# Patient Record
Sex: FEMALE | Race: WHITE | URBAN - METROPOLITAN AREA
[De-identification: names, ages, dates, MRNs, and addresses within clinical notes are randomized per-mention and may not be internally consistent; named-entity substitution may affect disease eponyms.]

---

## 2017-12-27 ENCOUNTER — HOSPITAL ENCOUNTER (OUTPATIENT)
Dept: SURGERY | Age: 57
Discharge: HOME | End: 2017-12-27
Attending: PODIATRIST | Admitting: PODIATRIST

## 2017-12-27 VITALS
WEIGHT: 210 LBS | HEART RATE: 55 BPM | DIASTOLIC BLOOD PRESSURE: 77 MMHG | SYSTOLIC BLOOD PRESSURE: 128 MMHG | HEIGHT: 64 IN | TEMPERATURE: 98.3 F | BODY MASS INDEX: 35.85 KG/M2 | OXYGEN SATURATION: 100 % | RESPIRATION RATE: 16 BRPM

## 2017-12-27 DIAGNOSIS — M72.2 PLANTAR FASCIITIS OF LEFT FOOT: ICD-10-CM

## 2017-12-27 RX ORDER — ACETAMINOPHEN 10 MG/ML
INJECTION, SOLUTION INTRAVENOUS
Status: DISCONTINUED
Start: 2017-12-27 | End: 2017-12-27 | Stop reason: WASHOUT

## 2017-12-27 RX ORDER — LABETALOL HYDROCHLORIDE 5 MG/ML
5 INJECTION, SOLUTION INTRAVENOUS EVERY 10 MIN PRN
Status: DISCONTINUED | OUTPATIENT
Start: 2017-12-27 | End: 2017-12-28 | Stop reason: HOSPADM

## 2017-12-27 RX ORDER — 0.9 % SODIUM CHLORIDE 0.9 %
10 VIAL (ML) INJECTION PRN
Status: DISCONTINUED | OUTPATIENT
Start: 2017-12-27 | End: 2017-12-28 | Stop reason: HOSPADM

## 2017-12-27 RX ORDER — LIDOCAINE HYDROCHLORIDE 10 MG/ML
1 INJECTION, SOLUTION EPIDURAL; INFILTRATION; INTRACAUDAL; PERINEURAL
Status: COMPLETED | OUTPATIENT
Start: 2017-12-27 | End: 2017-12-27

## 2017-12-27 RX ORDER — OXYCODONE HYDROCHLORIDE 5 MG/1
5 TABLET ORAL PRN
Status: COMPLETED | OUTPATIENT
Start: 2017-12-27 | End: 2017-12-27

## 2017-12-27 RX ORDER — OXYCODONE HYDROCHLORIDE AND ACETAMINOPHEN 5; 325 MG/1; MG/1
2 TABLET ORAL EVERY 4 HOURS PRN
Qty: 60 TABLET | Refills: 0 | Status: SHIPPED | OUTPATIENT
Start: 2017-12-27 | End: 2018-01-01

## 2017-12-27 RX ORDER — 0.9 % SODIUM CHLORIDE 0.9 %
10 VIAL (ML) INJECTION EVERY 12 HOURS SCHEDULED
Status: DISCONTINUED | OUTPATIENT
Start: 2017-12-27 | End: 2017-12-28 | Stop reason: HOSPADM

## 2017-12-27 RX ORDER — HYDROMORPHONE HCL 110MG/55ML
0.25 PATIENT CONTROLLED ANALGESIA SYRINGE INTRAVENOUS EVERY 5 MIN PRN
Status: DISCONTINUED | OUTPATIENT
Start: 2017-12-27 | End: 2017-12-28 | Stop reason: HOSPADM

## 2017-12-27 RX ORDER — MEPERIDINE HYDROCHLORIDE 25 MG/ML
12.5 INJECTION INTRAMUSCULAR; INTRAVENOUS; SUBCUTANEOUS EVERY 5 MIN PRN
Status: DISCONTINUED | OUTPATIENT
Start: 2017-12-27 | End: 2017-12-28 | Stop reason: HOSPADM

## 2017-12-27 RX ORDER — MORPHINE SULFATE 10 MG/ML
1 INJECTION, SOLUTION INTRAMUSCULAR; INTRAVENOUS EVERY 5 MIN PRN
Status: DISCONTINUED | OUTPATIENT
Start: 2017-12-27 | End: 2017-12-28 | Stop reason: HOSPADM

## 2017-12-27 RX ORDER — PROMETHAZINE HYDROCHLORIDE 25 MG/ML
6.25 INJECTION, SOLUTION INTRAMUSCULAR; INTRAVENOUS
Status: ACTIVE | OUTPATIENT
Start: 2017-12-27 | End: 2017-12-27

## 2017-12-27 RX ORDER — HYDROMORPHONE HCL 110MG/55ML
0.5 PATIENT CONTROLLED ANALGESIA SYRINGE INTRAVENOUS EVERY 5 MIN PRN
Status: DISCONTINUED | OUTPATIENT
Start: 2017-12-27 | End: 2017-12-28 | Stop reason: HOSPADM

## 2017-12-27 RX ORDER — PROMETHAZINE HYDROCHLORIDE 25 MG/1
25 TABLET ORAL EVERY 8 HOURS PRN
Qty: 15 TABLET | Refills: 0 | Status: SHIPPED | OUTPATIENT
Start: 2017-12-27 | End: 2018-01-01

## 2017-12-27 RX ORDER — ACETAMINOPHEN 10 MG/ML
INJECTION, SOLUTION INTRAVENOUS
Status: COMPLETED
Start: 2017-12-27 | End: 2017-12-27

## 2017-12-27 RX ORDER — DIPHENHYDRAMINE HYDROCHLORIDE 50 MG/ML
12.5 INJECTION INTRAMUSCULAR; INTRAVENOUS
Status: ACTIVE | OUTPATIENT
Start: 2017-12-27 | End: 2017-12-27

## 2017-12-27 RX ORDER — METOCLOPRAMIDE HYDROCHLORIDE 5 MG/ML
10 INJECTION INTRAMUSCULAR; INTRAVENOUS
Status: ACTIVE | OUTPATIENT
Start: 2017-12-27 | End: 2017-12-27

## 2017-12-27 RX ORDER — HYDRALAZINE HYDROCHLORIDE 20 MG/ML
5 INJECTION INTRAMUSCULAR; INTRAVENOUS EVERY 10 MIN PRN
Status: DISCONTINUED | OUTPATIENT
Start: 2017-12-27 | End: 2017-12-28 | Stop reason: HOSPADM

## 2017-12-27 RX ORDER — OXYCODONE HYDROCHLORIDE 5 MG/1
10 TABLET ORAL PRN
Status: COMPLETED | OUTPATIENT
Start: 2017-12-27 | End: 2017-12-27

## 2017-12-27 RX ORDER — SODIUM CHLORIDE, SODIUM LACTATE, POTASSIUM CHLORIDE, CALCIUM CHLORIDE 600; 310; 30; 20 MG/100ML; MG/100ML; MG/100ML; MG/100ML
INJECTION, SOLUTION INTRAVENOUS CONTINUOUS
Status: DISCONTINUED | OUTPATIENT
Start: 2017-12-27 | End: 2017-12-28 | Stop reason: HOSPADM

## 2017-12-27 RX ADMIN — SODIUM CHLORIDE, SODIUM LACTATE, POTASSIUM CHLORIDE, CALCIUM CHLORIDE: 600; 310; 30; 20 INJECTION, SOLUTION INTRAVENOUS at 14:29

## 2017-12-27 RX ADMIN — LIDOCAINE HYDROCHLORIDE 0.1 ML: 10 INJECTION, SOLUTION EPIDURAL; INFILTRATION; INTRACAUDAL; PERINEURAL at 14:28

## 2017-12-27 RX ADMIN — OXYCODONE HYDROCHLORIDE 5 MG: 5 TABLET ORAL at 15:29

## 2017-12-27 RX ADMIN — ACETAMINOPHEN 1000 MG: 10 INJECTION, SOLUTION INTRAVENOUS at 14:29

## 2017-12-27 ASSESSMENT — PAIN SCALES - GENERAL
PAINLEVEL_OUTOF10: 0
PAINLEVEL_OUTOF10: 7

## 2017-12-27 ASSESSMENT — ACTIVITIES OF DAILY LIVING (ADL): EFFECT OF PAIN ON DAILY ACTIVITIES: STANDING, WALKING

## 2017-12-27 ASSESSMENT — PAIN - FUNCTIONAL ASSESSMENT: PAIN_FUNCTIONAL_ASSESSMENT: 0-10

## 2017-12-27 ASSESSMENT — PAIN DESCRIPTION - DESCRIPTORS: DESCRIPTORS: SHARP

## 2017-12-27 NOTE — ANESTHESIA PRE-OP
Department of Anesthesiology  Preprocedure Note       Name:  Shade GUAJARDO   Age:  61 y.o.  :  1960                                          MRN:  [id number]         Date:  2017      Surgeon: Valentin José    Procedure: Endoscopic Left Plantar Fasciotomy    Medications prior to admission:   Prior to Admission medications    Medication Sig Start Date End Date Taking? Authorizing Provider   clonazepam (KLONOPIN) 0.5 MG tablet Take 1 tablet by mouth every 12 hours as needed for Anxiety. 11  Donald Haywood MD       Current medications:    Current Outpatient Prescriptions   Medication Sig Dispense Refill    clonazepam (KLONOPIN) 0.5 MG tablet Take 1 tablet by mouth every 12 hours as needed for Anxiety.  180 tablet 1     Current Facility-Administered Medications   Medication Dose Route Frequency Provider Last Rate Last Dose    HYDROmorphone (DILAUDID) injection 0.25 mg  0.25 mg Intravenous Q5 Min PRN Anthony Champagne MD        HYDROmorphone (DILAUDID) injection 0.5 mg  0.5 mg Intravenous Q5 Min PRN Anthony Champagne MD        morphine (PF) injection 1 mg  1 mg Intravenous Q5 Min PRN Anthony Champagne MD        HYDROmorphone (DILAUDID) injection 0.5 mg  0.5 mg Intravenous Q5 Min PRN Anthony Champagne MD        oxyCODONE (ROXICODONE) immediate release tablet 5 mg  5 mg Oral PRN Anthony Champagne MD        Or    oxyCODONE (ROXICODONE) immediate release tablet 10 mg  10 mg Oral PRN Anthony Champagne MD        diphenhydrAMINE (BENADRYL) injection 12.5 mg  12.5 mg Intravenous Once PRN Anthony Champagne MD        metoclopramide (REGLAN) injection 10 mg  10 mg Intravenous Once PRN Anthony Champagne MD        promethazine (PHENERGAN) injection 6.25 mg  6.25 mg Intravenous Once PRN Anthony Champagne MD        labetalol (NORMODYNE;TRANDATE) injection 5 mg  5 mg Intravenous Q10 Min PRN Anthony Champagne MD        hydrALAZINE (APRESOLINE) injection 5 mg  5 mg Intravenous Q10 Min PRN Anthony Champagne MD        meperidine (DEMEROL) injection 12.5 mg  12.5 mg Intravenous Q5 Min PRN Samina Cueva MD           Allergies: Allergies   Allergen Reactions    Bactrim Rash       Problem List:    Patient Active Problem List   Diagnosis Code    Migraine G43.909    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    Anxiety F41.9       Past Medical History:        Diagnosis Date    Headache(784.0)     migraines    Lupus erythematosis discoid        Past Surgical History:        Procedure Laterality Date    ROTATOR CUFF REPAIR      LEFT    SPINE SURGERY      lumbar    SPLENECTOMY  1981       Social History:    Social History   Substance Use Topics    Smoking status: Former Smoker     Packs/day: 1.00     Years: 25.00     Types: Cigarettes    Smokeless tobacco: Never Used      Comment: VAPE    Alcohol use No                                Counseling given: Not Answered      Vital Signs (Current): There were no vitals filed for this visit. BP Readings from Last 3 Encounters:   05/02/13 123/66   12/13/11 108/70   10/13/11 118/82       NPO Status:                                                                                 BMI:   Wt Readings from Last 3 Encounters:   12/22/17 210 lb (95.3 kg)   05/02/13 185 lb (83.9 kg)   04/30/13 185 lb (83.9 kg)     There is no height or weight on file to calculate BMI. Anesthesia Evaluation  Patient summary reviewed and Nursing notes reviewed no history of anesthetic complications:   Airway: Mallampati: II  TM distance: >3 FB   Neck ROM: full  Mouth opening: > = 3 FB Dental:          Pulmonary:Negative Pulmonary ROS                              Cardiovascular:Negative CV ROS                      Neuro/Psych:   (+) headaches:,             GI/Hepatic/Renal: Neg GI/Hepatic/Renal ROS            Endo/Other:                      ROS comment: Lupus Abdominal:           Vascular: negative vascular ROS.                                        Anesthesia

## 2017-12-27 NOTE — ANESTHESIA POST-OP
Postoperative Anesthesia Note    Name:    Deonte Ellison  MRN:      [id number]    Patient Vitals for the past 12 hrs:   BP Temp Temp src Pulse Resp SpO2 Height Weight   12/27/17 1542 128/77 - - 55 16 100 % - -   12/27/17 1529 123/71 - - 58 16 99 % - -   12/27/17 1524 127/68 - - 64 16 99 % - -   12/27/17 1522 127/61 97.4 °F (36.3 °C) Temporal 64 16 - - -   12/27/17 1337 - - - - - - 5' 4\" (1.626 m) 210 lb (95.3 kg)        LABS:    CBC  No results found for: WBC, HGB, HCT, PLT  RENAL  No results found for: NA, K, CL, CO2, BUN, CREATININE, GLUCOSE  COAGS  No results found for: PROTIME, INR, APTT    Intake & Output: In: 500 [I.V.:500]  Out: -     Nausea & Vomiting:  No    Level of Consciousness:  Awake    Pain Assessment:  Adequate analgesia    Anesthesia Complications:  No apparent anesthetic complications    SUMMARY      Vital signs stable  OK to discharge from Stage I post anesthesia care.   Care transferred from Anesthesiology department on discharge from perioperative area

## 2017-12-27 NOTE — BRIEF OP NOTE
Brief Postoperative Note    Jere Cox [id number]  YOB: 1960  [id number]      Surgeon: SRIKANTH Peirce DPM     Pre-operative Diagnosis:  Plantar fasciitis left. Calcaneal spur left. Pain in left foot. Post-operative Diagnosis:  same    Procedure: Endoscopic plantar fasciotomy left with application of posterior splint. Anesthesia:  General endotrachial anesthesia    Estimated blood loss:  <10 mL    Specimens:  None    Findings:  As expected    Hemostasis: PAT left @ 250 mmHg for 14 minutes    Materials/Drains/Implants: 4-0 Nylon     Injectables: 10 cc 0.5% Marcaine plain     Total IV fluids:  Per anesthesia    Complications:  None    Condition:  Stable    See dictated operative report for full details.     Electronically signed by:  Flakito Mesa DPM                       12/27/2017 3:29 PM         Electronically signed by Flakito Mesa DPM on 12/27/2017 at 3:29 PM

## 2017-12-27 NOTE — PROGRESS NOTES
Assessment unchanged from previous. Verbalizes understanding of discharge instructions and written instructions also given to patient. Questions and concerns addressed at this time. Denies any needs at this time. IV d/c'd. Pt discharged via wheelchair to car in good condition.
Patient arrived phase II connected to monitors.  Report received from RN
Pre-Operative:  1. Patient/Caregiver identifies - states name and date of birth. 2. The patient is free from signs and symptoms of injury. 3. The patient receives appropriate medication(s), safely administered during the Perioperative period. 4. The patient is free from signs and symptoms of infection. 5. The patient has wound / tissue perfusion. 6. The patients's fluid, electrolyte, and acid-base balances are established preoperatively. 7. The patient's pulmonary function is established preoperatively. 8. The patient's cardiovascular status is established preoperatively. 9. The patient / caregiver demonstrates knowledge of nutritional management related to the operative or other invasive procedure. 10. The patient/caregiver demonstrates knowledge of medication management. 11. The patient/caregiver demonstrates knowledge of pain management. 12. The patient participates in the rehabilitation process as applicable. 13. The patient/caregiver participates in decisions affection his or her Perioperative plan of care. 14. The patient's care is consistent with the individualized Perioperative plan of care. 15. The patient's right to privacy is maintained. 16. The patient is the recipient of competent and ethical care within legal standards of practice. 17. The patient's value system, lifestyle, ethnicity, and culture are considered, respected, and incorporated in the Perioperative plan of care and understands special services available. 18. The patient demonstrates and/or reports adequate pain control throughout the the Perioperative period. 19. The patient's neurological status is established preoperatively. 20. The patient/caregiver demonstrates knowledge of the expected responses to the operative or invasive procedure. 21. Patient/Caregiver has reduced anxiety. Interventions- Familiarize with environment and equipment.   22. Patient/Caregiver verbalizes understanding of Phase I and Phase II
established preoperatively. 23. The patient/caregiver demonstrates knowledge of the expected responses to the operative or invasive procedure. 20. Patient/Caregiver has reduced anxiety. Interventions- Familiarize with environment and equipment. 21. Potential for fall the patient will move to fall risk upon sedation through the recovery phase. [place] to environment. Call light in reach. Instruct to call for assistance prior to getting up. Non skid footwear on. Bed wheels locked. Bed in lowest position. Side rails up times two.
recovery phase. [place] to environment. Call light in reach. Instruct to call for assistance prior to getting up. Non skid footwear. Bed wheels locked. Bed in lowest position. Side rails up times two.

## 2017-12-28 NOTE — OP NOTE
[address]                  [address] [address]                                 OPERATIVE REPORT    PATIENT NAME: MIDKIFF, Carles Santos                  :        1960  MED REC NO:   [id number]                          ROOM:  ACCOUNT NO:   [de-identified]                          ADMIT DATE: 2017  PROVIDER:     Ayush Pierce Dpm    DATE OF PROCEDURE:  2017    LOCATION:  [facility]. PREOPERATIVE DIAGNOSES:    1.  Plantar fasciitis, left foot. 2.  Calcaneal spur, left foot. 3.  Pain in left foot. POSTOPERATIVE DIAGNOSES:  1.  Plantar fasciitis, left foot. 2.  Calcaneal spur, left foot. 3.  Pain in left foot. OPERATION PERFORMED:  Endoscopic plantar fasciotomy, left, with application  of posterior splint. SURGEON:  Lynn Pierce DPM    ANESTHESIA:  General.    ESTIMATED BLOOD LOSS:  Less than 10 mL. SPECIMENS:  None. FINDINGS:  As expected. HEMOSTASIS:  Pneumatic ankle tourniquet, left, at 250 mmHg for 14 minutes. MATERIALS:  A 4-0 nylon. INJECTABLES:  A 10 cc of 0.5% Marcaine plain. IV FLUIDS:  Per Anesthesia. COMPLICATIONS:  None. CONDITION:  Stable. INDICATIONS FOR PROCEDURE:  The patient is a 54-year-old female who has  been seen and treated in my office conservatively for painful plantar  fasciitis. She has failed to improve with steroid injection, oral  antiinflammatory medication, formal physical therapy, and boot  immobilization. For this reason, an MRI was ordered, which did confirm the  presence of active chronic plantar fasciitis. As the patient had failed  conservative care, surgical options were discussed with the patient in  detail including all benefits, risks, complications, and alternatives  associated with an endoscopic plantar fasciotomy, and she did opt for  surgical correction. Consent was signed and in the chart.   Labs and  radiographs were reviewed prior to surgery. NPO status confirmed since  midnight. No guarantees were given or implied. OPERATIVE PROCEDURE:  After receiving 2 gm of IV Ancef, the patient was  brought into the operating room and placed on the operating table in a  supine position. Following induction of general anesthesia, a pneumatic  ankle tourniquet was placed about the left ankle; and the left lower  extremity was scrubbed, prepped, and draped in the usual aseptic manner. A  time-out was completed. The left lower extremity was then elevated and  exsanguinated, and the pneumatic ankle tourniquet was inflated at 250 mmHg. At this time attention was directed to the plantar medial aspect of the  left heel where approximately 5.5 cm distal from the posterior aspect of  the heel and approximately 1.8 cm superior from the plantar aspect of the  heel, a stab incision was made via 15 blade. Hemostats were used to carry  this incision deep and a fascial elevator was inserted along the plantar  aspect of the plantar fascia from medial to lateral.  This was then removed  and replaced with a trocar and cannula, again inserted from medial to  lateral.  When there was tenting noted on the lateral heel skin, a 15 blade  was used to create a stab incision and the trocar and cannula passed  through the lateral incision. The slotted cannula was left in place and  the trocar was removed. A cotton-swab applicator was then passed from  medial to lateral to clear the cannula. A camera was then inserted  laterally and a blunt probe inserted medially; and the plantar fascia was  probed. Next utilizing a hook blade and under direct visualization with  the camera, the medial two thirds of the plantar fascia was released  without incident. The fascia was noted to be extremely thickened and thus  required several passes of the hook blade to completely release. When this  was completed, the camera and the hook blade were removed.   The trocar was  reinserted into the cannula and turned 180 degrees to ensure that no fibers  were split upon insertion. With no fibers noted plantarly, the trocar was  removed and the area was flushed with copious amounts of sterile normal  saline. The trocar was reinserted and the cannula and trocar removed in  toto. The incision site for the medial and lateral heel were  reapproximated with 4-0 nylon. A postoperative injection was administered  with 10 cc of 0.5% Marcaine plain. A postoperative dressing was applied  with Xeroform, 4 x 4's, Virgilio, Ace wrap, and a 5 x 31 posterior splint was  applied in a standard fashion. The pneumatic ankle tourniquet was deflated  for a total of 14 minutes. The patient tolerated this procedure and  anesthesia well, and was sent to recovery with vital signs stable and  vascular status intact to all digits to the left foot. Following a period of postoperative monitoring, the patient will be  discharged home with the following instructions. She is to rest, ice and  elevate the left lower extremity. She is to leave the postoperative  dressing clean, dry, and intact, it is not to be removed. She is to remain  nonweightbearing and does have crutches at home. She has been given the  following prescriptions today, Percocet 5/325 mg tablets one to two p.o.  q.4.h. p.r.n. pain, Phenergan 25 mg one p.o. q.8. hours p.r.n. nausea; and  she was provided two prescriptions for blood thinner but instructed to only  fill one; Lovenox 40 mg subcutaneous injection or Xarelto 10 mg tablet. She was instructed to begin the blood thinner that was covered by her  insurance tomorrow morning and continue daily for 14 days. She has been  reviewed the signs and symptoms of blood clot and PE, and to present to the  ER with any concerning complications. She does have followup scheduled  with me next week in my office and may contact the office sooner with any  questions, problems, or concerns.         YANY SINGH,

## 2024-11-12 ENCOUNTER — ANESTHESIA EVENT (OUTPATIENT)
Dept: OPERATING ROOM | Age: 64
End: 2024-11-12
Payer: MEDICARE

## 2024-11-13 RX ORDER — CYCLOPENT/TROPIC/PHEN/KETR/WAT 1 %-1%-10%
0.3 DROPS (EA) OPHTHALMIC (EYE) EVERY 10 MIN PRN
Status: CANCELLED | OUTPATIENT
Start: 2024-11-22

## 2024-11-13 NOTE — PROGRESS NOTES

## 2024-11-21 NOTE — ANESTHESIA PRE PROCEDURE
Encounters:   11/13/24 95.3 kg (210 lb)   12/27/17 95.3 kg (210 lb)   12/22/17 95.3 kg (210 lb)     Body mass index is 37.2 kg/m².    CBC: No results found for: \"WBC\", \"RBC\", \"HGB\", \"HCT\", \"MCV\", \"RDW\", \"PLT\"    CMP: No results found for: \"NA\", \"K\", \"CL\", \"CO2\", \"BUN\", \"CREATININE\", \"GFRAA\", \"AGRATIO\", \"LABGLOM\", \"GLUCOSE\", \"GLU\", \"CALCIUM\", \"BILITOT\", \"ALKPHOS\", \"AST\", \"ALT\"    POC Tests: No results for input(s): \"POCGLU\", \"POCNA\", \"POCK\", \"POCCL\", \"POCBUN\", \"POCHEMO\", \"POCHCT\" in the last 72 hours.    Coags: No results found for: \"PROTIME\", \"INR\", \"APTT\"    HCG (If Applicable): No results found for: \"PREGTESTUR\", \"PREGSERUM\", \"HCG\", \"HCGQUANT\"     ABGs: No results found for: \"PHART\", \"PO2ART\", \"HNI5TNO\", \"LNS6WVM\", \"BEART\", \"S4VTUGBD\"     Type & Screen (If Applicable):  No results found for: \"ABORH\", \"LABANTI\"    Drug/Infectious Status (If Applicable):  No results found for: \"HIV\", \"HEPCAB\"    COVID-19 Screening (If Applicable): No results found for: \"COVID19\"        Anesthesia Evaluation  Patient summary reviewed and Nursing notes reviewed   no history of anesthetic complications:   Airway: Mallampati: II     Neck ROM: full     Dental:          Pulmonary:Negative Pulmonary ROS and normal exam                               Cardiovascular:Negative CV ROS                      Neuro/Psych:   Negative Neuro/Psych ROS  (+) headaches:, psychiatric history:depression/anxiety             GI/Hepatic/Renal: Neg GI/Hepatic/Renal ROS       (-) hiatal hernia and GERD       Endo/Other: Negative Endo/Other ROS                    Abdominal:             Vascular:          Other Findings:       Anesthesia Plan      general     ASA 2     (I discussed with the patient the risks and benefits of PIV, general anesthesia, IV Narcotics, PACU.  All questions were answered the patient agrees with the plan and wishes to proceed.  )  Induction: intravenous.                        Froy Medina MD   11/21/2024

## 2024-11-22 ENCOUNTER — HOSPITAL ENCOUNTER (OUTPATIENT)
Age: 64
Setting detail: OUTPATIENT SURGERY
Discharge: HOME | End: 2024-11-22
Attending: OPHTHALMOLOGY | Admitting: OPHTHALMOLOGY
Payer: MEDICARE

## 2024-11-22 ENCOUNTER — ANESTHESIA (OUTPATIENT)
Dept: OPERATING ROOM | Age: 64
End: 2024-11-22
Payer: MEDICARE

## 2024-11-22 VITALS
HEIGHT: 63 IN | HEART RATE: 60 BPM | TEMPERATURE: 97.1 F | DIASTOLIC BLOOD PRESSURE: 83 MMHG | RESPIRATION RATE: 16 BRPM | SYSTOLIC BLOOD PRESSURE: 139 MMHG | OXYGEN SATURATION: 98 % | BODY MASS INDEX: 37.21 KG/M2 | WEIGHT: 210 LBS

## 2024-11-22 PROCEDURE — 3600000013 HC SURGERY LEVEL 3 ADDTL 15MIN: Performed by: OPHTHALMOLOGY

## 2024-11-22 PROCEDURE — 7100000011 HC PHASE II RECOVERY - ADDTL 15 MIN: Performed by: OPHTHALMOLOGY

## 2024-11-22 PROCEDURE — 6360000002 HC RX W HCPCS: Performed by: OPHTHALMOLOGY

## 2024-11-22 PROCEDURE — 2709999900 HC NON-CHARGEABLE SUPPLY: Performed by: OPHTHALMOLOGY

## 2024-11-22 PROCEDURE — 2500000003 HC RX 250 WO HCPCS: Performed by: OPHTHALMOLOGY

## 2024-11-22 PROCEDURE — 7100000010 HC PHASE II RECOVERY - FIRST 15 MIN: Performed by: OPHTHALMOLOGY

## 2024-11-22 PROCEDURE — V2632 POST CHMBR INTRAOCULAR LENS: HCPCS | Performed by: OPHTHALMOLOGY

## 2024-11-22 PROCEDURE — 3600000003 HC SURGERY LEVEL 3 BASE: Performed by: OPHTHALMOLOGY

## 2024-11-22 PROCEDURE — 6370000000 HC RX 637 (ALT 250 FOR IP): Performed by: OPHTHALMOLOGY

## 2024-11-22 PROCEDURE — 3700000001 HC ADD 15 MINUTES (ANESTHESIA): Performed by: OPHTHALMOLOGY

## 2024-11-22 PROCEDURE — 2580000003 HC RX 258: Performed by: ANESTHESIOLOGY

## 2024-11-22 PROCEDURE — 2500000003 HC RX 250 WO HCPCS: Performed by: ANESTHESIOLOGY

## 2024-11-22 PROCEDURE — 3700000000 HC ANESTHESIA ATTENDED CARE: Performed by: OPHTHALMOLOGY

## 2024-11-22 PROCEDURE — 6360000002 HC RX W HCPCS: Performed by: NURSE ANESTHETIST, CERTIFIED REGISTERED

## 2024-11-22 DEVICE — STERILE UV AND BLUE LIGHT FILTERING ACRYLIC FOLDABLE ASPHERIC POSTERIOR CHAMBER INTRAOCULAR LENS
Type: IMPLANTABLE DEVICE | Site: LENS | Status: FUNCTIONAL
Brand: CLAREON

## 2024-11-22 RX ORDER — SODIUM CHLORIDE 0.9 % (FLUSH) 0.9 %
5-40 SYRINGE (ML) INJECTION PRN
Status: DISCONTINUED | OUTPATIENT
Start: 2024-11-22 | End: 2024-11-22 | Stop reason: HOSPADM

## 2024-11-22 RX ORDER — SODIUM CHLORIDE, SODIUM LACTATE, POTASSIUM CHLORIDE, CALCIUM CHLORIDE 600; 310; 30; 20 MG/100ML; MG/100ML; MG/100ML; MG/100ML
INJECTION, SOLUTION INTRAVENOUS CONTINUOUS
Status: DISCONTINUED | OUTPATIENT
Start: 2024-11-22 | End: 2024-11-22 | Stop reason: HOSPADM

## 2024-11-22 RX ORDER — SODIUM CHLORIDE 0.9 % (FLUSH) 0.9 %
5-40 SYRINGE (ML) INJECTION EVERY 12 HOURS SCHEDULED
Status: DISCONTINUED | OUTPATIENT
Start: 2024-11-22 | End: 2024-11-22 | Stop reason: HOSPADM

## 2024-11-22 RX ORDER — PILOCARPINE HYDROCHLORIDE 20 MG/ML
SOLUTION/ DROPS OPHTHALMIC PRN
Status: DISCONTINUED | OUTPATIENT
Start: 2024-11-22 | End: 2024-11-22 | Stop reason: ALTCHOICE

## 2024-11-22 RX ORDER — SODIUM CHLORIDE 9 MG/ML
INJECTION, SOLUTION INTRAVENOUS PRN
Status: DISCONTINUED | OUTPATIENT
Start: 2024-11-22 | End: 2024-11-22 | Stop reason: HOSPADM

## 2024-11-22 RX ORDER — TETRACAINE HYDROCHLORIDE 5 MG/ML
SOLUTION OPHTHALMIC PRN
Status: DISCONTINUED | OUTPATIENT
Start: 2024-11-22 | End: 2024-11-22 | Stop reason: ALTCHOICE

## 2024-11-22 RX ORDER — PREDNISOLONE ACETATE 10 MG/ML
1 SUSPENSION/ DROPS OPHTHALMIC SEE ADMIN INSTRUCTIONS
Status: DISCONTINUED | OUTPATIENT
Start: 2024-11-22 | End: 2024-11-22 | Stop reason: HOSPADM

## 2024-11-22 RX ORDER — PROPOFOL 10 MG/ML
INJECTION, EMULSION INTRAVENOUS
Status: DISCONTINUED | OUTPATIENT
Start: 2024-11-22 | End: 2024-11-22 | Stop reason: SDUPTHER

## 2024-11-22 RX ORDER — TOBRAMYCIN AND DEXAMETHASONE 3; 1 MG/ML; MG/ML
1 SUSPENSION/ DROPS OPHTHALMIC SEE ADMIN INSTRUCTIONS
Status: DISCONTINUED | OUTPATIENT
Start: 2024-11-22 | End: 2024-11-22 | Stop reason: HOSPADM

## 2024-11-22 RX ORDER — BROMFENAC 1.03 MG/ML
1 SOLUTION/ DROPS OPHTHALMIC SEE ADMIN INSTRUCTIONS
Status: DISCONTINUED | OUTPATIENT
Start: 2024-11-22 | End: 2024-11-22 | Stop reason: HOSPADM

## 2024-11-22 RX ORDER — PREDNISOLONE ACETATE 10 MG/ML
SUSPENSION/ DROPS OPHTHALMIC PRN
Status: DISCONTINUED | OUTPATIENT
Start: 2024-11-22 | End: 2024-11-22 | Stop reason: ALTCHOICE

## 2024-11-22 RX ORDER — BROMFENAC 1.03 MG/ML
1 SOLUTION/ DROPS OPHTHALMIC ONCE
Status: DISCONTINUED | OUTPATIENT
Start: 2024-11-22 | End: 2024-11-22 | Stop reason: HOSPADM

## 2024-11-22 RX ORDER — SODIUM CHLORIDE, POTASSIUM CHLORIDE, CALCIUM CHLORIDE, MAGNESIUM CHLORIDE, SODIUM ACETATE, AND SODIUM CITRATE 6.4; .75; .48; .3; 3.9; 1.7 MG/ML; MG/ML; MG/ML; MG/ML; MG/ML; MG/ML
SOLUTION IRRIGATION PRN
Status: DISCONTINUED | OUTPATIENT
Start: 2024-11-22 | End: 2024-11-22 | Stop reason: ALTCHOICE

## 2024-11-22 RX ORDER — LIDOCAINE HYDROCHLORIDE 20 MG/ML
INJECTION, SOLUTION EPIDURAL; INFILTRATION; INTRACAUDAL; PERINEURAL
Status: DISCONTINUED | OUTPATIENT
Start: 2024-11-22 | End: 2024-11-22 | Stop reason: SDUPTHER

## 2024-11-22 RX ORDER — TOBRAMYCIN AND DEXAMETHASONE 3; 1 MG/ML; MG/ML
SUSPENSION/ DROPS OPHTHALMIC PRN
Status: DISCONTINUED | OUTPATIENT
Start: 2024-11-22 | End: 2024-11-22 | Stop reason: ALTCHOICE

## 2024-11-22 RX ADMIN — Medication 0.3 ML: at 08:49

## 2024-11-22 RX ADMIN — LIDOCAINE HYDROCHLORIDE 50 MG: 20 INJECTION, SOLUTION EPIDURAL; INFILTRATION; INTRACAUDAL; PERINEURAL at 09:51

## 2024-11-22 RX ADMIN — FAMOTIDINE 20 MG: 10 INJECTION, SOLUTION INTRAVENOUS at 09:14

## 2024-11-22 RX ADMIN — PROPOFOL 70 MG: 10 INJECTION, EMULSION INTRAVENOUS at 09:51

## 2024-11-22 RX ADMIN — Medication 0.3 ML: at 09:00

## 2024-11-22 RX ADMIN — Medication 0.3 ML: at 09:10

## 2024-11-22 RX ADMIN — BROMFENAC 1 DROP: 0.9 SOLUTION/ DROPS OPHTHALMIC at 08:49

## 2024-11-22 ASSESSMENT — PAIN - FUNCTIONAL ASSESSMENT
PAIN_FUNCTIONAL_ASSESSMENT: NONE - DENIES PAIN
PAIN_FUNCTIONAL_ASSESSMENT: 0-10

## 2024-11-22 NOTE — ANESTHESIA POSTPROCEDURE EVALUATION
Department of Anesthesiology  Postprocedure Note    Patient: Barbara G Midkiff  MRN: [id number]  YOB: 1960  Date of evaluation: 11/22/2024    Procedure Summary       Date: 11/22/24 Room / Location: [address]    Anesthesia Start: 0944 Anesthesia Stop: 1020    Procedure: PHACO EMULSIFICATION OF CATARACT WITH INTRAOCULAR LENS IMPLANT RIGHT EYE (Right: Eye) Diagnosis:       Other age-related cataract of right eye      (Other age-related cataract of right eye [H25.89])    Surgeons: Conrado Sun MD Responsible Provider: Froy Medina MD    Anesthesia Type: general ASA Status: 2            Anesthesia Type: No value filed.    Surjit Phase I: Surjit Score: 10    Surjit Phase II: Surjit Score: 10    Anesthesia Post Evaluation    Comments: Postoperative Anesthesia Note    Name:    Barbara G Midkiff  MRN:      [id number]    Patient Vitals in the past 12 hrs:  11/22/24 1038, BP:139/83, Pulse:60, Resp:16, SpO2:98 %  11/22/24 1022, BP:(!) 145/83, Temp:97.1 °F (36.2 °C), Temp src:Temporal, Pulse:68, Resp:12, SpO2:99 %  11/22/24 0853, BP:(!) 127/96, Temp:98.2 °F (36.8 °C), Temp src:Temporal, Pulse:63, Resp:14, SpO2:96 %, Height:1.6 m (5' 3\"), Weight:95.3 kg (210 lb)     LABS:    CBC  No results found for: \"WBC\", \"HGB\", \"HCT\", \"PLT\"  RENAL  No results found for: \"NA\", \"K\", \"CL\", \"CO2\", \"BUN\", \"CREATININE\", \"GLUCOSE\"  COAGS  No results found for: \"PROTIME\", \"INR\", \"APTT\"    Intake & Output:  @24HRIO@    Nausea & Vomiting:  No    Level of Consciousness:  Awake    Pain Assessment:  Adequate analgesia    Anesthesia Complications:  No apparent anesthetic complications    SUMMARY      Vital signs stable  OK to discharge from Stage I post anesthesia care.  Care transferred from Anesthesiology department on discharge from perioperative area     No notable events documented.

## 2024-11-22 NOTE — DISCHARGE INSTRUCTIONS
General Instructions After Surgery  Conrado Sun M.D.  Office [phone]  Home [phone]    1. Tobradex eye drops: one drop every two hours while awake start at _____noon_____       Continue one drop every two hours until seen in office at ____220pm_______    2. Bring all eye medications to the office with you today/tomorrow.    3. It is important to remember not to bend your head below your waist (you can squat or bend down on one knee, keeping your head upright.)    4. DO NOT LIFT objects heavier than 10 pounds, which is equal to a gallon of milk.    5. You can wash your face, but avoid the area around your operated eye. We recommend a partial bath today and tomorrow. When you resume normal bathing, be sure to turn your back in the shower avoiding water and shampoo from running into your operated eye. Avoid washing your hair for the first three days.    6. Lie on the un operated side while sleeping, or on your back.    7. PROTECT YOUR OPERATED EYE Wear your shield at bedtime at all times. During the day be sure to wear your glasses (or sunglasses provided) if you leave home. Remember your glasses will make your vision blurry in your operated eye, but will not in anyway damage your eye.    8. NEVER RUB OR PUSH ON YOUR EYE This is the worst thing that you can do while it is healing.    9. It is normal for the white part of the eye to appear red or bloodshot. DO NOT WORRY, this will clear in several weeks.    10. You should have NO PAIN after surgery. The eye may feel slightly irritated at first and (2) Tylenol may be used. If you have actual pain, increasing discomfort or sudden decrease in vision call us immediately.    11. Temporary \"floaters\" are not uncommon immediately after surgery, these will disappear. Your vision should gradually improve. Notify us immediately if you notice a shower of new floaters or flashes develop.

## 2024-11-22 NOTE — OP NOTE
OPERATIVE NOTE    Patient Name   Date of Birth Age  MRN#  Barbara G Midkiff   1960   64 y.o.  [id number]       Surgeon        Surgery Date  Conrado Sun MD        11/22/2024       Preoperative Diagnosis:  Nuclear Sclerotic Cataract right eye    Postoperative Diagnosis: Nuclear Sclerotic Cataract right eye    Operative Procedure: Phacoemulsification/ Posterior Chamber Intraocular Lens Implantation          Anesthesia:   Peribulbar Block with MAC    Details of Procedure:    The patient was brought to the operating room on the operative stretcher in the supine position with the head resting in the head rest.  After appropriate anesthesia monitoring devices were applied, IV sedation was carried out per the anesthesia service.  Once sedation was achieved, a peribulbar block was performed, using a 5 mL combination solution containing 4 mL of 2% lidocaine with 1 mL of Vitrase.  Approximately 2-3 mL of this solution was administered in a peribulbar fashion through the lower lid of the operative eye.    Once appropriate akinesia and anesthesia were demonstrated, the operative eye was prepped and draped in the usual sterile fashion.  Tobradex drops and Tetracain drops were administered.  A wire lid speculum was then inserted into the operative eye.  A paracentesis was then performed using a 15 degree supersharp blade to enter the globe at the limbus, and a .12 mm colibri forceps was used to stabilize the globe.  Viscoat was then instilled into the anterior chamber.  A temporal clear cornea groove was then created using the 15 degree supersharp blade.  The cornea was then entered using the [place] 2.4 mm clearcut keratome blade.  The capsulotomy was then performed using a cystotome, and the capsulorrhexis was completed using uttrata forceps.  The nucleus of the cataract was then hydrodissected and hydrodelineated using sterile BSS on a 27 gauge cannula.  The nucleus of the cataract was then removed using the

## 2024-11-22 NOTE — H&P
Surgical Service History and Physical    CHIEF COMPLAINT:   Decreased Vision and Glare    Reason for Admission:  Cataract Surgery    History Obtained From:  Patient    HISTORY OF PRESENT ILLNESS:  Pt has noticed painless progressive loss of vision and is experiencing functional difficulty.          Past Medical History:        Diagnosis Date    Headache(784.0)     migraines    Lupus erythematosis discoid        Past Surgical History:        Procedure Laterality Date    FOOT SURGERY Left 2017    ROTATOR CUFF REPAIR      LEFT    SPINE SURGERY      lumbar    SPLENECTOMY  1981       Allergies:  Bactrim    Prior to Admission medications    Not on File         REVIEW OF SYSTEMS:    CONSTITUTIONAL:  negative  EYES: negative  HEENT:  negative  RESPIRATORY:  negative  CARDIOVASCULAR:  negative  MUSCULOSKELETAL:  negative  NEUROLOGICAL:  negative    PHYSICAL EXAM:    VITALS:  BP (!) 127/96   Pulse 63   Temp 98.2 °F (36.8 °C) (Temporal)   Resp 14   Ht 1.6 m (5' 3\")   Wt 95.3 kg (210 lb)   SpO2 96%   BMI 37.20 kg/m²   TEMPERATURE:  Current - Temp: 98.2 °F (36.8 °C); Max - Temp  Av.2 °F (36.8 °C)  Min: 98.2 °F (36.8 °C)  Max: 98.2 °F (36.8 °C)  RESPIRATIONS RANGE: Resp  Av  Min: 14  Max: 14  PULSE RANGE: Pulse  Av  Min: 63  Max: 63  BLOOD PRESSURE RANGE:  Systolic (24hrs), Av , Min:127 , Max:127   ; Diastolic (24hrs), Av, Min:96, Max:96    CONSTITUTIONAL:  awake, alert, cooperative, no apparent distress, and appears stated age  EYES:  Lids and lashes normal, pupils equal, round and reactive to light, extra ocular muscles intact, sclera clear, conjunctiva normal  ENT:  Normocephalic, without obvious abnormality, atramatic, sinuses nontender on palpation, external ears without lesions, oral pharynx with moist mucus membranes, tonsils without erythema or exudates, gums normal and good dentition.  NECK:  Supple, symmetrical, trachea midline, no adenopathy, thyroid symmetric, not enlarged and no  tenderness, skin normal  LUNGS:  No increased work of breathing, good air exchange, clear to auscultation bilaterally, no crackles or wheezing  CARDIOVASCULAR:  Normal apical impulse, regular rate and rhythm, normal S1 and S2, no S3 or S4, and no murmur noted  ABDOMEN:  No scars, normal bowel sounds, soft, non-distended, non-tender, no masses palpated, no hepatosplenomegally  MUSCULOSKELETAL:  There is no redness, warmth, or swelling of the joints.  Full range of motion noted.  Motor strength is 5 out of 5 all extremities bilaterally.  Tone is normal.  NEUROLOGIC:  Awake, alert, oriented to name, place and time.  Cranial nerves II-XII are grossly intact.  Motor is 5 out of 5 bilaterally.  Cerebellar finger to nose, heel to shin intact.  Sensory is intact.  Babinski down going, Romberg negative, and gait is normal.      Impression: Visually Significant Cataract    Plan: Cataract Surgery         Conrado Sun MD

## (undated) DEVICE — GLOVE ORANGE PI 8   MSG9080

## (undated) DEVICE — PREP SOL PVP IODINE 4%  4 OZ/BTL

## (undated) DEVICE — 6 ML SYRINGE LUER-LOCK TIP: Brand: MONOJECT

## (undated) DEVICE — SURGICAL PROCEDURE PACK EYE CLERMONT

## (undated) DEVICE — Device

## (undated) DEVICE — NEEDLE HYPO 25GA L1.5IN BLU POLYPR HUB S STL REG BVL STR